# Patient Record
Sex: FEMALE | Employment: FULL TIME
[De-identification: names, ages, dates, MRNs, and addresses within clinical notes are randomized per-mention and may not be internally consistent; named-entity substitution may affect disease eponyms.]

---

## 2023-06-06 ENCOUNTER — NURSE TRIAGE (OUTPATIENT)
Dept: OTHER | Facility: CLINIC | Age: 48
End: 2023-06-06

## 2023-06-06 NOTE — TELEPHONE ENCOUNTER
[place]    Received call from Vance at [facility] with [employer]. Subjective: Caller states \"I get decent amount of sleep but I just feel like I am tired all the time. My arms feel like they just go to sleep from time to time. If I move them they get better. \"     Current Symptoms: tingling in both arms (2 weeks), extreme fatigue (worsening over the past month), generalized body aches, increase SOB with activity. Onset: 2 week ago;     Associated Symptoms: NA    Pain Severity: denies     Temperature: denies     What has been tried: denies    LMP: IUD Pregnant: No    Recommended disposition: See PCP within 3 Days - recommended [facility] if unable to get in within timeframe. Care advice provided, patient verbalizes understanding; denies any other questions or concerns; instructed to call back for any new or worsening symptoms. Patient/Caller agrees with recommended disposition; writer provided warm transfer to Nick Fan at [facility] for appointment scheduling    Attention Provider: Thank you for allowing me to participate in the care of your patient. The patient was connected to triage in response to information provided to the E[facility]/PSC. Please do not respond through this encounter as the response is not directed to a shared pool.         Reason for Disposition   Fatigue (i.e., tires easily, decreased energy) and persists > 1 week   Numbness or tingling in one or both hands is a chronic symptom (recurrent or ongoing problem lasting > 4 weeks)    Protocols used: Neurologic Deficit-ADULT-[place], Weakness (Generalized) and Fatigue-ADULT-[place]

## 2023-06-14 ENCOUNTER — TELEPHONE (OUTPATIENT)
Dept: PRIMARY CARE CLINIC | Facility: CLINIC | Age: 48
End: 2023-06-14